# Patient Record
Sex: MALE | Race: WHITE | NOT HISPANIC OR LATINO | URBAN - METROPOLITAN AREA
[De-identification: names, ages, dates, MRNs, and addresses within clinical notes are randomized per-mention and may not be internally consistent; named-entity substitution may affect disease eponyms.]

---

## 2020-02-16 ENCOUNTER — EMERGENCY (EMERGENCY)
Facility: HOSPITAL | Age: 75
LOS: 1 days | Discharge: HOME | End: 2020-02-16
Attending: EMERGENCY MEDICINE | Admitting: EMERGENCY MEDICINE
Payer: MEDICARE

## 2020-02-16 VITALS
RESPIRATION RATE: 19 BRPM | TEMPERATURE: 97 F | SYSTOLIC BLOOD PRESSURE: 197 MMHG | WEIGHT: 216.05 LBS | HEART RATE: 70 BPM | OXYGEN SATURATION: 98 % | DIASTOLIC BLOOD PRESSURE: 93 MMHG

## 2020-02-16 LAB — OB PNL STL: POSITIVE

## 2020-02-16 PROCEDURE — 86850 RBC ANTIBODY SCREEN: CPT

## 2020-02-16 PROCEDURE — 93005 ELECTROCARDIOGRAM TRACING: CPT

## 2020-02-16 PROCEDURE — 99285 EMERGENCY DEPT VISIT HI MDM: CPT

## 2020-02-16 PROCEDURE — 36415 COLL VENOUS BLD VENIPUNCTURE: CPT

## 2020-02-16 PROCEDURE — 86901 BLOOD TYPING SEROLOGIC RH(D): CPT

## 2020-02-16 PROCEDURE — 85027 COMPLETE CBC AUTOMATED: CPT

## 2020-02-16 PROCEDURE — 85610 PROTHROMBIN TIME: CPT

## 2020-02-16 PROCEDURE — 86900 BLOOD TYPING SEROLOGIC ABO: CPT

## 2020-02-16 PROCEDURE — 80053 COMPREHEN METABOLIC PANEL: CPT

## 2020-02-16 PROCEDURE — 99283 EMERGENCY DEPT VISIT LOW MDM: CPT | Mod: 25

## 2020-02-16 PROCEDURE — 93010 ELECTROCARDIOGRAM REPORT: CPT

## 2020-02-16 PROCEDURE — 82272 OCCULT BLD FECES 1-3 TESTS: CPT

## 2020-02-16 PROCEDURE — 85730 THROMBOPLASTIN TIME PARTIAL: CPT

## 2020-02-16 RX ORDER — PHENYLEPHRINE-SHARK LIVER OIL-MINERAL OIL-PETROLATUM RECTAL OINTMENT
1 OINTMENT (GRAM) RECTAL ONCE
Refills: 0 | Status: COMPLETED | OUTPATIENT
Start: 2020-02-16 | End: 2020-02-16

## 2020-02-16 NOTE — ED PROVIDER NOTE - NSFOLLOWUPINSTRUCTIONS_ED_ALL_ED_FT
Follow up with your gastroenterologist this week. Take colace stool softners, use preparation h rectal ointment, take daily sitz baths.

## 2020-02-16 NOTE — ED PROVIDER NOTE - PROGRESS NOTE DETAILS
tiesha feeling well, no acute distress, noted elevated blood pressure, patient states he feels anxious, already took his blood pressure medication, agrees to f/u with his gastro regaring hemorrhoid, told to take stool softners, preparation h, sitz baths tiesha feeling well, no acute distress, noted elevated blood pressure, patient states he feels anxious, already took his blood pressure medication, agrees to f/u with his gastro regaring hemorrhoid, told to take stool softners, preparation h, sitz baths, patient aware of elevated creatnine, states it is being followed

## 2020-02-16 NOTE — ED PROVIDER NOTE - PHYSICAL EXAMINATION
rectal- good tone, bright red blood, no stool, (+)hemorrhoid, non thrombosed, non tender, no anal fissure

## 2020-02-16 NOTE — ED PROVIDER NOTE - OBJECTIVE STATEMENT
74 male PMH HTN, CAD, cardiac stent, TIA on aspirin/dypridamole presents to ER c/o rectal bleeding. Patient states he was feeling well, while having a BM patient states he pushed harder than usual then noted blood in the toliet and came to ER. Patient states he feels well, no pain.

## 2020-02-16 NOTE — ED PROVIDER NOTE - CARE PROVIDER_API CALL
Giorgio Warren)  Gastroenterology; Internal Medicine  [address] [place] [address], [address]  [place] [address]  Phone: [phone]  Fax: [phone]  Follow Up Time:

## 2020-02-17 VITALS
TEMPERATURE: 98 F | RESPIRATION RATE: 16 BRPM | DIASTOLIC BLOOD PRESSURE: 83 MMHG | OXYGEN SATURATION: 98 % | SYSTOLIC BLOOD PRESSURE: 161 MMHG | HEART RATE: 63 BPM

## 2020-02-17 LAB
ALBUMIN SERPL ELPH-MCNC: 3.8 G/DL — SIGNIFICANT CHANGE UP (ref 3.3–5)
ALP SERPL-CCNC: 64 U/L — SIGNIFICANT CHANGE UP (ref 40–120)
ALT FLD-CCNC: 33 U/L — SIGNIFICANT CHANGE UP (ref 12–78)
ANION GAP SERPL CALC-SCNC: 6 MMOL/L — SIGNIFICANT CHANGE UP (ref 5–17)
APTT BLD: 30.8 SEC — SIGNIFICANT CHANGE UP (ref 28.5–37)
AST SERPL-CCNC: 21 U/L — SIGNIFICANT CHANGE UP (ref 15–37)
BASOPHILS # BLD AUTO: 0.04 K/UL — SIGNIFICANT CHANGE UP (ref 0–0.2)
BASOPHILS NFR BLD AUTO: 0.8 % — SIGNIFICANT CHANGE UP (ref 0–2)
BILIRUB SERPL-MCNC: 0.3 MG/DL — SIGNIFICANT CHANGE UP (ref 0.2–1.2)
BLD GP AB SCN SERPL QL: SIGNIFICANT CHANGE UP
BUN SERPL-MCNC: 26 MG/DL — HIGH (ref 7–23)
CALCIUM SERPL-MCNC: 8.9 MG/DL — SIGNIFICANT CHANGE UP (ref 8.5–10.1)
CHLORIDE SERPL-SCNC: 113 MMOL/L — HIGH (ref 96–108)
CO2 SERPL-SCNC: 26 MMOL/L — SIGNIFICANT CHANGE UP (ref 22–31)
CREAT SERPL-MCNC: 2 MG/DL — HIGH (ref 0.5–1.3)
EOSINOPHIL # BLD AUTO: 0.14 K/UL — SIGNIFICANT CHANGE UP (ref 0–0.5)
EOSINOPHIL NFR BLD AUTO: 2.9 % — SIGNIFICANT CHANGE UP (ref 0–6)
GLUCOSE SERPL-MCNC: 108 MG/DL — HIGH (ref 70–99)
HCT VFR BLD CALC: 36.7 % — LOW (ref 39–50)
HGB BLD-MCNC: 12.4 G/DL — LOW (ref 13–17)
IMM GRANULOCYTES NFR BLD AUTO: 0.2 % — SIGNIFICANT CHANGE UP (ref 0–1.5)
INR BLD: 0.99 RATIO — SIGNIFICANT CHANGE UP (ref 0.88–1.16)
LYMPHOCYTES # BLD AUTO: 1.54 K/UL — SIGNIFICANT CHANGE UP (ref 1–3.3)
LYMPHOCYTES # BLD AUTO: 31.8 % — SIGNIFICANT CHANGE UP (ref 13–44)
MCHC RBC-ENTMCNC: 31.1 PG — SIGNIFICANT CHANGE UP (ref 27–34)
MCHC RBC-ENTMCNC: 33.8 GM/DL — SIGNIFICANT CHANGE UP (ref 32–36)
MCV RBC AUTO: 92 FL — SIGNIFICANT CHANGE UP (ref 80–100)
MONOCYTES # BLD AUTO: 0.5 K/UL — SIGNIFICANT CHANGE UP (ref 0–0.9)
MONOCYTES NFR BLD AUTO: 10.3 % — SIGNIFICANT CHANGE UP (ref 2–14)
NEUTROPHILS # BLD AUTO: 2.61 K/UL — SIGNIFICANT CHANGE UP (ref 1.8–7.4)
NEUTROPHILS NFR BLD AUTO: 54 % — SIGNIFICANT CHANGE UP (ref 43–77)
NRBC # BLD: 0 /100 WBCS — SIGNIFICANT CHANGE UP (ref 0–0)
PLATELET # BLD AUTO: 139 K/UL — LOW (ref 150–400)
POTASSIUM SERPL-MCNC: 3.8 MMOL/L — SIGNIFICANT CHANGE UP (ref 3.5–5.3)
POTASSIUM SERPL-SCNC: 3.8 MMOL/L — SIGNIFICANT CHANGE UP (ref 3.5–5.3)
PROT SERPL-MCNC: 6.7 G/DL — SIGNIFICANT CHANGE UP (ref 6–8.3)
PROTHROM AB SERPL-ACNC: 11.1 SEC — SIGNIFICANT CHANGE UP (ref 10–12.9)
RBC # BLD: 3.99 M/UL — LOW (ref 4.2–5.8)
RBC # FLD: 14 % — SIGNIFICANT CHANGE UP (ref 10.3–14.5)
SODIUM SERPL-SCNC: 145 MMOL/L — SIGNIFICANT CHANGE UP (ref 135–145)
WBC # BLD: 4.84 K/UL — SIGNIFICANT CHANGE UP (ref 3.8–10.5)
WBC # FLD AUTO: 4.84 K/UL — SIGNIFICANT CHANGE UP (ref 3.8–10.5)

## 2020-02-17 RX ORDER — DOCUSATE SODIUM 100 MG
1 CAPSULE ORAL
Qty: 20 | Refills: 0
Start: 2020-02-17 | End: 2020-02-26

## 2020-02-17 RX ORDER — MINERAL OIL, PETROLATUM, PHENYLEPHRINE HCL 2.5; 140; 749 MG/G; MG/G; MG/G
1 OINTMENT TOPICAL
Qty: 30 | Refills: 0
Start: 2020-02-17 | End: 2020-02-26

## 2020-02-17 RX ADMIN — PHENYLEPHRINE-SHARK LIVER OIL-MINERAL OIL-PETROLATUM RECTAL OINTMENT 1 APPLICATION(S): at 00:19

## 2020-02-17 NOTE — ED ADULT NURSE NOTE - CHPI ED NUR SYMPTOMS NEG
no tingling/no weakness/no fever/no nausea/no chills/no vomiting/no pain/no decreased eating/drinking/no dizziness

## 2020-02-17 NOTE — ED ADULT NURSE NOTE - NSIMPLEMENTINTERV_GEN_ALL_ED
Implemented All Universal Safety Interventions:  [place] to call system. Call bell, personal items and telephone within reach. Instruct patient to call for assistance. Room bathroom lighting operational. Non-slip footwear when patient is off stretcher. Physically safe environment: no spills, clutter or unnecessary equipment. Stretcher in lowest position, wheels locked, appropriate side rails in place.

## 2020-02-21 NOTE — ED ADULT NURSE NOTE - OBJECTIVE STATEMENT
Final Result   No acute cardiopulmonary disease. Discharge Medications     Medication List      START taking these medications    lisinopril 10 MG tablet  Commonly known as:  PRINIVIL;ZESTRIL  Take 1 tablet by mouth daily        CHANGE how you take these medications    hydrOXYzine 50 MG capsule  Commonly known as:  VISTARIL  What changed:  Another medication with the same name was removed. Continue taking this medication, and follow the directions you see here. meloxicam 15 MG tablet  Commonly known as:  MOBIC  What changed:  Another medication with the same name was removed. Continue taking this medication, and follow the directions you see here. CONTINUE taking these medications    buPROPion 100 MG tablet  Commonly known as:  WELLBUTRIN     busPIRone 15 MG tablet  Commonly known as:  BUSPAR     escitalopram 10 MG tablet  Commonly known as:  LEXAPRO     polyethylene glycol powder  Commonly known as:  GLYCOLAX     Tylenol PM Extra Strength  MG tablet  Generic drug:  diphenhydrAMINE-APAP (sleep)     vitamin B-12 500 MCG tablet  Commonly known as:  CYANOCOBALAMIN        STOP taking these medications    docusate 100 MG Caps  Commonly known as:  COLACE, DULCOLAX     HYDROcodone-acetaminophen 7.5-325 MG per tablet  Commonly known as:  NORCO     ibuprofen 600 MG tablet  Commonly known as:  ADVIL;MOTRIN     NONFORMULARY     ondansetron 4 MG tablet  Commonly known as:  Zofran     pregabalin 50 MG capsule  Commonly known as:  LYRICA     traZODone 100 MG tablet  Commonly known as:  DESYREL           Where to Get Your Medications      These medications were sent to [address] [place] [address] [address], [address] [phone] - [phone]  [address] [place] [address], [address] [place] [id number]    Phone:  [phone]   · lisinopril 10 MG tablet           Discharged in stable condition to Home     Follow Up:   Follow up with PCP in 1 week, referral to endocrinology/ENT for parathyroidectomy, BMP
Pt comes from triage. Pt reports rectal bleeding after straining to have a bowel movement today. Pt breathing easy, unlabored, no signs of distress. Pt denies dizziness.

## 2022-03-21 PROBLEM — Z00.00 ENCOUNTER FOR PREVENTIVE HEALTH EXAMINATION: Status: ACTIVE | Noted: 2022-03-21

## 2022-04-01 ENCOUNTER — APPOINTMENT (OUTPATIENT)
Dept: HEPATOLOGY | Facility: CLINIC | Age: 77
End: 2022-04-01
Payer: MEDICARE

## 2022-04-01 PROCEDURE — 91200 LIVER ELASTOGRAPHY: CPT

## 2023-10-21 ENCOUNTER — OFFICE (OUTPATIENT)
Dept: URBAN - METROPOLITAN AREA CLINIC 102 | Facility: CLINIC | Age: 78
Setting detail: OPHTHALMOLOGY
End: 2023-10-21
Payer: MEDICARE

## 2023-10-21 DIAGNOSIS — H43.813: ICD-10-CM

## 2023-10-21 DIAGNOSIS — H40.033: ICD-10-CM

## 2023-10-21 DIAGNOSIS — H25.13: ICD-10-CM

## 2023-10-21 PROCEDURE — 92020 GONIOSCOPY: CPT | Performed by: OPHTHALMOLOGY

## 2023-10-21 PROCEDURE — 92014 COMPRE OPH EXAM EST PT 1/>: CPT | Performed by: OPHTHALMOLOGY

## 2023-10-21 ASSESSMENT — SPHEQUIV_DERIVED
OS_SPHEQUIV: -1.75
OD_SPHEQUIV: -0.625
OS_SPHEQUIV: -1.125
OS_SPHEQUIV: -1.75

## 2023-10-21 ASSESSMENT — REFRACTION_CURRENTRX
OD_ADD: +2.25
OD_ADD: +2.25
OD_OVR_VA: 20/
OS_AXIS: 72
OS_AXIS: 72
OD_VPRISM_DIRECTION: PROGS
OD_CYLINDER: SPH
OD_OVR_VA: 20/
OS_CYLINDER: -1.25
OS_VPRISM_DIRECTION: PROGS
OD_VPRISM_DIRECTION: PROGS
OS_OVR_VA: 20/
OS_VPRISM_DIRECTION: PROGS
OS_OVR_VA: 20/
OS_SPHERE: -0.50
OS_SPHERE: -0.50
OS_CYLINDER: -1.25
OS_ADD: +2.25
OD_SPHERE: -0.75
OD_SPHERE: -0.75
OD_CYLINDER: SPH
OS_ADD: +2.25

## 2023-10-21 ASSESSMENT — KERATOMETRY
OS_K1POWER_DIOPTERS: 41.25
OS_AXISANGLE_DEGREES: 153
OD_AXISANGLE_DEGREES: 032
OS_K2POWER_DIOPTERS: 41.75
OD_K1POWER_DIOPTERS: 41.50
METHOD_AUTO_MANUAL: AUTO
OD_K2POWER_DIOPTERS: 41.75

## 2023-10-21 ASSESSMENT — REFRACTION_AUTOREFRACTION
OS_SPHERE: -0.75
OD_SPHERE: -0.50
OS_CYLINDER: -2.00
OD_CYLINDER: -0.25
OS_AXIS: 066
OD_AXIS: 095

## 2023-10-21 ASSESSMENT — REFRACTION_MANIFEST
OD_CYLINDER: SPH
OS_ADD: +2.50
OD_SPHERE: -0.75
OS_CYLINDER: -1.25
OS_ADD: +2.25
OS_CYLINDER: -1.50
OS_SPHERE: -0.50
OD_VA1: 20/20
OS_AXIS: 72
OD_CYLINDER: SPH
OD_ADD: +2.50
OD_ADD: +2.25
OS_SPHERE: -1.00
OS_VA1: 20/20-
OD_SPHERE: -0.75
OS_AXIS: 72

## 2023-10-21 ASSESSMENT — VISUAL ACUITY
OS_BCVA: 20/20
OD_BCVA: 20/25

## 2023-10-21 ASSESSMENT — AXIALLENGTH_DERIVED
OS_AL: 25.0988
OS_AL: 24.8262
OD_AL: 24.5625
OS_AL: 25.0988

## 2023-10-21 ASSESSMENT — TONOMETRY
OD_IOP_MMHG: 10
OS_IOP_MMHG: 10

## 2023-10-21 ASSESSMENT — CONFRONTATIONAL VISUAL FIELD TEST (CVF)
OD_FINDINGS: FULL
OS_FINDINGS: FULL

## 2024-10-26 ENCOUNTER — NON-APPOINTMENT (OUTPATIENT)
Age: 79
End: 2024-10-26

## 2024-10-28 ENCOUNTER — APPOINTMENT (OUTPATIENT)
Dept: SURGICAL ONCOLOGY | Facility: CLINIC | Age: 79
End: 2024-10-28
Payer: MEDICARE

## 2024-10-28 VITALS
BODY MASS INDEX: 26.28 KG/M2 | SYSTOLIC BLOOD PRESSURE: 164 MMHG | HEART RATE: 66 BPM | HEIGHT: 72 IN | DIASTOLIC BLOOD PRESSURE: 90 MMHG | WEIGHT: 194 LBS | OXYGEN SATURATION: 99 %

## 2024-10-28 DIAGNOSIS — N63.0 UNSPECIFIED LUMP IN UNSPECIFIED BREAST: ICD-10-CM

## 2024-10-28 PROCEDURE — 99205 OFFICE O/P NEW HI 60 MIN: CPT

## 2024-11-11 ENCOUNTER — APPOINTMENT (OUTPATIENT)
Dept: MAMMOGRAPHY | Facility: CLINIC | Age: 79
End: 2024-11-11
Payer: MEDICARE

## 2024-11-11 ENCOUNTER — APPOINTMENT (OUTPATIENT)
Dept: ULTRASOUND IMAGING | Facility: CLINIC | Age: 79
End: 2024-11-11
Payer: MEDICARE

## 2024-11-11 ENCOUNTER — OUTPATIENT (OUTPATIENT)
Dept: OUTPATIENT SERVICES | Facility: HOSPITAL | Age: 79
LOS: 1 days | End: 2024-11-11
Payer: MEDICARE

## 2024-11-11 ENCOUNTER — RESULT REVIEW (OUTPATIENT)
Age: 79
End: 2024-11-11

## 2024-11-11 ENCOUNTER — NON-APPOINTMENT (OUTPATIENT)
Age: 79
End: 2024-11-11

## 2024-11-11 DIAGNOSIS — N63.0 UNSPECIFIED LUMP IN UNSPECIFIED BREAST: ICD-10-CM

## 2024-11-11 PROCEDURE — 77066 DX MAMMO INCL CAD BI: CPT | Mod: 26

## 2024-11-11 PROCEDURE — G0279: CPT | Mod: 26

## 2024-11-11 PROCEDURE — 76641 ULTRASOUND BREAST COMPLETE: CPT | Mod: 26,LT,GZ

## 2024-11-20 PROCEDURE — 76641 ULTRASOUND BREAST COMPLETE: CPT

## 2024-11-20 PROCEDURE — G0279: CPT

## 2024-11-20 PROCEDURE — 77066 DX MAMMO INCL CAD BI: CPT

## 2024-11-26 ENCOUNTER — OFFICE (OUTPATIENT)
Dept: URBAN - METROPOLITAN AREA CLINIC 102 | Facility: CLINIC | Age: 79
Setting detail: OPHTHALMOLOGY
End: 2024-11-26
Payer: MEDICARE

## 2024-11-26 DIAGNOSIS — H40.033: ICD-10-CM

## 2024-11-26 DIAGNOSIS — H90.3: ICD-10-CM

## 2024-11-26 DIAGNOSIS — H25.13: ICD-10-CM

## 2024-11-26 DIAGNOSIS — H52.4: ICD-10-CM

## 2024-11-26 DIAGNOSIS — H43.813: ICD-10-CM

## 2024-11-26 PROCEDURE — 92015 DETERMINE REFRACTIVE STATE: CPT | Performed by: OPHTHALMOLOGY

## 2024-11-26 PROCEDURE — 92014 COMPRE OPH EXAM EST PT 1/>: CPT | Performed by: OPHTHALMOLOGY

## 2024-11-26 PROCEDURE — 92567 TYMPANOMETRY: CPT | Mod: AB | Performed by: AUDIOLOGIST

## 2024-11-26 PROCEDURE — 92557 COMPREHENSIVE HEARING TEST: CPT | Mod: AB | Performed by: AUDIOLOGIST

## 2024-11-26 PROCEDURE — 92020 GONIOSCOPY: CPT | Performed by: OPHTHALMOLOGY

## 2024-11-26 ASSESSMENT — REFRACTION_CURRENTRX
OS_OVR_VA: 20/
OS_AXIS: 72
OS_SPHERE: -0.50
OS_CYLINDER: -1.25
OS_AXIS: 72
OS_ADD: +2.25
OD_OVR_VA: 20/
OD_VPRISM_DIRECTION: PROGS
OS_OVR_VA: 20/
OD_ADD: +2.25
OS_CYLINDER: -1.25
OS_VPRISM_DIRECTION: PROGS
OD_ADD: +2.25
OS_SPHERE: -0.50
OD_SPHERE: -0.75
OD_CYLINDER: SPH
OD_CYLINDER: SPH
OD_VPRISM_DIRECTION: PROGS
OS_ADD: +2.25
OD_SPHERE: -0.75
OD_OVR_VA: 20/
OS_VPRISM_DIRECTION: PROGS

## 2024-11-26 ASSESSMENT — REFRACTION_MANIFEST
OS_AXIS: 072
OS_AXIS: 72
OD_CYLINDER: SPH
OS_ADD: +2.50
OD_CYLINDER: SPH
OS_CYLINDER: -1.75
OS_CYLINDER: -1.25
OD_ADD: +2.50
OD_SPHERE: -0.50
OD_VA1: 20/30+
OD_ADD: +2.25
OS_SPHERE: -1.25
OS_SPHERE: -0.50
OS_VA1: 20/25-2
OD_SPHERE: -0.75
OS_ADD: +2.25

## 2024-11-26 ASSESSMENT — KERATOMETRY
OS_AXISANGLE_DEGREES: 140
OS_K1POWER_DIOPTERS: 41.00
OD_K2POWER_DIOPTERS: 41.75
OD_K1POWER_DIOPTERS: 41.25
METHOD_AUTO_MANUAL: AUTO
OS_K2POWER_DIOPTERS: 42.00
OD_AXISANGLE_DEGREES: 045

## 2024-11-26 ASSESSMENT — REFRACTION_AUTOREFRACTION
OD_SPHERE: 0.00
OS_AXIS: 072
OD_CYLINDER: -0.50
OD_AXIS: 107
OS_CYLINDER: -1.75
OS_SPHERE: -0.75

## 2024-11-26 ASSESSMENT — VISUAL ACUITY
OS_BCVA: 20/30-1
OD_BCVA: 20/30

## 2024-11-26 ASSESSMENT — CONFRONTATIONAL VISUAL FIELD TEST (CVF)
OS_FINDINGS: FULL
OD_FINDINGS: FULL

## 2025-02-10 ENCOUNTER — NON-APPOINTMENT (OUTPATIENT)
Age: 80
End: 2025-02-10

## 2025-02-10 ENCOUNTER — APPOINTMENT (OUTPATIENT)
Dept: SURGICAL ONCOLOGY | Facility: CLINIC | Age: 80
End: 2025-02-10
Payer: MEDICARE

## 2025-02-10 VITALS
OXYGEN SATURATION: 99 % | SYSTOLIC BLOOD PRESSURE: 152 MMHG | HEIGHT: 72 IN | DIASTOLIC BLOOD PRESSURE: 72 MMHG | RESPIRATION RATE: 17 BRPM | HEART RATE: 72 BPM | BODY MASS INDEX: 26.41 KG/M2 | WEIGHT: 195 LBS

## 2025-02-10 DIAGNOSIS — N63.0 UNSPECIFIED LUMP IN UNSPECIFIED BREAST: ICD-10-CM

## 2025-02-10 PROCEDURE — 99214 OFFICE O/P EST MOD 30 MIN: CPT
